# Patient Record
Sex: MALE
[De-identification: names, ages, dates, MRNs, and addresses within clinical notes are randomized per-mention and may not be internally consistent; named-entity substitution may affect disease eponyms.]

---

## 2022-06-08 ENCOUNTER — FORM ENCOUNTER (OUTPATIENT)
Age: 77
End: 2022-06-08

## 2022-06-08 ENCOUNTER — APPOINTMENT (OUTPATIENT)
Dept: ORTHOPEDIC SURGERY | Facility: CLINIC | Age: 77
End: 2022-06-08
Payer: MEDICARE

## 2022-06-08 VITALS — BODY MASS INDEX: 35.26 KG/M2 | HEIGHT: 63 IN | WEIGHT: 199 LBS

## 2022-06-08 DIAGNOSIS — Z78.9 OTHER SPECIFIED HEALTH STATUS: ICD-10-CM

## 2022-06-08 DIAGNOSIS — Z87.39 PERSONAL HISTORY OF OTHER DISEASES OF THE MUSCULOSKELETAL SYSTEM AND CONNECTIVE TISSUE: ICD-10-CM

## 2022-06-08 DIAGNOSIS — I10 ESSENTIAL (PRIMARY) HYPERTENSION: ICD-10-CM

## 2022-06-08 DIAGNOSIS — E78.00 PURE HYPERCHOLESTEROLEMIA, UNSPECIFIED: ICD-10-CM

## 2022-06-08 PROBLEM — Z00.00 ENCOUNTER FOR PREVENTIVE HEALTH EXAMINATION: Status: ACTIVE | Noted: 2022-06-08

## 2022-06-08 PROCEDURE — 99214 OFFICE O/P EST MOD 30 MIN: CPT

## 2022-06-09 ENCOUNTER — APPOINTMENT (OUTPATIENT)
Dept: MRI IMAGING | Facility: CLINIC | Age: 77
End: 2022-06-09
Payer: MEDICARE

## 2022-06-09 PROCEDURE — 72141 MRI NECK SPINE W/O DYE: CPT | Mod: MH

## 2022-06-11 NOTE — DISCUSSION/SUMMARY
[Medication Risks Reviewed] : Medication risks reviewed [de-identified] : Discussed at length underlying pathology of cervical spine stenosis. Discussed red flags symptoms to be mindful of such as weakness in upper extremities, loss of dexterity, and loss of gait and balance. Discussed proper body mechanics and modified physical activity to avoid aggravation of symptoms. Patient will begin with at home exercises/stretches as best tolerated. Discussed conservative treatment options in the form of NSAIDs, physical therapy, and injection therapy. Patient referred for cervical spine MRI scan for persistent neck pain with RT upper extremity numbness/tingling sensation despite medical intervention with known history of stenosis. Evaluate for disc herniation vs progression of stenosis. Patient will follow up after MRI scan.

## 2022-06-11 NOTE — PHYSICAL EXAM
[Normal Coordination] : normal coordination [Normal DTR UE/LE] : normal DTR UE/LE  [Normal Sensation] : normal sensation [Normal Mood and Affect] : normal mood and affect [Orientated] : orientated [Able to Communicate] : able to communicate [Normal Skin] : normal skin [No Rash] : no rash [No Ulcers] : no ulcers [No Lesions] : no lesions [No obvious lymphadenopathy in areas examined] : no obvious lymphadenopathy in areas examined [Well Developed] : well developed [Well Nourished] : well nourished [Peripheral vascular exam is grossly normal] : peripheral vascular exam is grossly normal [No Respiratory Distress] : no respiratory distress [Lungs clear to auscultation bilaterally] : lungs clear to auscultation bilaterally [NL (80)] : right lateral rotation 80 degrees [Flexion] : flexion [Extension] : extension [Biceps 2+] : biceps 2+ [Triceps 2+] : triceps 2+ [Brachioradialis 2+] : brachioradialis 2+ [NL (90)] : forward flexion 90 degrees [NL (30)] : right lateral rotation 30 degrees [NL (45)] : extension 45 degrees [NL (40)] : right lateral bending 40 degrees [5___] : right extensor hallicus longus 5[unfilled]/5 [] : non-antalgic

## 2022-06-22 ENCOUNTER — APPOINTMENT (OUTPATIENT)
Dept: ORTHOPEDIC SURGERY | Facility: CLINIC | Age: 77
End: 2022-06-22
Payer: MEDICARE

## 2022-06-22 VITALS — BODY MASS INDEX: 35.26 KG/M2 | WEIGHT: 199 LBS | HEIGHT: 63 IN

## 2022-06-22 DIAGNOSIS — Z78.9 OTHER SPECIFIED HEALTH STATUS: ICD-10-CM

## 2022-06-22 PROCEDURE — 99214 OFFICE O/P EST MOD 30 MIN: CPT

## 2022-06-27 NOTE — DATA REVIEWED
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

## 2022-06-27 NOTE — HISTORY OF PRESENT ILLNESS
[de-identified] : Patient states the severity of his back pain has been the same since his previous visit. Patient is complaining of neck pain and stiffness with numbness/tingling sensation to RT hand that gets worse with laying down. General medical health is good. No changes to upper extremity strength. No known trauma. Patient has known history of cervical stenosis. Treatment with OTC NSAIDs when needed provide relief. Patient states his numbness/tingling symptoms are intermittent.

## 2022-06-27 NOTE — DISCUSSION/SUMMARY
[Medication Risks Reviewed] : Medication risks reviewed [de-identified] : Discussed at length underlying pathology of cervical spine stenosis. Discussed red flags symptoms to be mindful of such as weakness in upper extremities, loss of dexterity, and loss of gait and balance. Discussed proper body mechanics and modified physical activity to avoid aggravation of symptoms. MRI showing multilevel cord compression most severe on the right at C3-C4 and C4-C5 Patient will begin with at home exercises/stretches as best tolerated. Discussed conservative treatment options in the form of NSAIDs, physical therapy, and injection therapy. Patient was advised of precautions and risks with falling. Discussed possible surgical intervention at length including ACDF and posterior laminectomy and fusion . Patient has elected to proceed with surgical intervention. Risks and benefits discussed.

## 2022-06-29 ENCOUNTER — APPOINTMENT (OUTPATIENT)
Dept: ORTHOPEDIC SURGERY | Facility: CLINIC | Age: 77
End: 2022-06-29

## 2022-06-29 VITALS — BODY MASS INDEX: 35.26 KG/M2 | HEIGHT: 63 IN | WEIGHT: 199 LBS

## 2022-06-29 DIAGNOSIS — Z87.891 PERSONAL HISTORY OF NICOTINE DEPENDENCE: ICD-10-CM

## 2022-06-29 DIAGNOSIS — G95.9 DISEASE OF SPINAL CORD, UNSPECIFIED: ICD-10-CM

## 2022-06-29 DIAGNOSIS — M47.812 SPONDYLOSIS W/OUT MYELOPATHY OR RADICULOPATHY, CERVICAL REGION: ICD-10-CM

## 2022-06-29 PROCEDURE — 99214 OFFICE O/P EST MOD 30 MIN: CPT

## 2022-06-30 ENCOUNTER — TRANSCRIPTION ENCOUNTER (OUTPATIENT)
Age: 77
End: 2022-06-30

## 2022-07-04 PROBLEM — Z87.891 FORMER SMOKER: Status: ACTIVE | Noted: 2022-06-29

## 2022-07-04 NOTE — DISCUSSION/SUMMARY
[Surgical risks reviewed] : Surgical risks reviewed [de-identified] : In the office today we spent approximately 30 minutes reviewing the proposed surgical procedure in terms of cervical decompression and fusion C3/4, 4/5.  This would be an anterior and posterior procedure.The planned surgical procedure was reviewed and explained to His satisfaction including the risks and benefits. \par This risk benefit conversation included, but was not limited to infection, bleeding, neurological injury, CSF leak, need for dural repair, hardware mal position, pseudoarthrosis, need for additional operation in the future, risks of general anesthesia. Expected outcomes included reduction in pain, improvement in function and expected recovery timeframe. All questions were answered to their satisfaction. Pt will monitor for for progression of symptoms and acknowledges activity risks. \par \par Surgery is tentatively scheduled for end of August 2022\par \par \par \par \par

## 2022-07-04 NOTE — DATA REVIEWED
[MRI] : MRI [Cervical Spine] : cervical spine [Report was reviewed and noted in the chart] : The report was reviewed and noted in the chart [I independently reviewed and interpreted images and report] : I independently reviewed and interpreted images and report [I reviewed the films/CD and additionally noted] : I reviewed the films/CD and additionally noted [FreeTextEntry1] : I stop paperwork reviewed

## 2022-07-04 NOTE — HISTORY OF PRESENT ILLNESS
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 He has several questions regarding proposed surgical procedure that was discussed at the last visit. \par \par He reports no changes in his current symptoms. He was last seen approximately one week ago. He continues to experience numbness/tingling into the right hand primarily.\par  [de-identified] : None